# Patient Record
(demographics unavailable — no encounter records)

---

## 2025-03-03 NOTE — REASON FOR VISIT
[Annual Wellness Visit] : an annual wellness visit [FreeTextEntry1] :  passed away  she is grieving. her mom is in a nursing home w dementia

## 2025-03-03 NOTE — COUNSELING
[Encouraged to increase physical activity] : Encouraged to increase physical activity [Fall prevention counseling provided] : Fall prevention counseling provided [Adequate lighting] : Adequate lighting

## 2025-03-03 NOTE — ASSESSMENT
[FreeTextEntry1] : 74 yo wr medicare wellness htn hld  hypothyroidism cont rosuvastin 5 mg qd 90 change to losartan 50 mg qd 90 ( telmisartan not covered) levothyrocine 100 mcg qd 90 gerd  omeprazole 40 mg qd  famotidine 20 mg qd 90  memory loss  trial memantine 10 mg qd 90  titrate to 10 mg bid  as tolerated or to desired effect  consider ssri  need for tdap. Information regarding   tdap    immunization reviewed. All questions answered. Immunization given   .5 cc  intramuscular  l      deltoid. No reaction noted. Advised patients to wash hands to reduce the spread of infection  health maintenance mammogram screening breast cancer utd pap utd bone density screening osteoporosis done colonoscopy due in 2 y   annual eye exam utd

## 2025-03-03 NOTE — HISTORY OF PRESENT ILLNESS
[FreeTextEntry1] : patient presents for CPE.  [de-identified] : 73 F is here for physical examination. my  passed away in Dec. I am very saddened.  my memory is poor My mom just went into a nursing home I have knee pain i got cortisone  and gel shots and it helped. i use a cane at times. I got injections in my back it helped a lot   3/2/24 cpe dr aponte  She is concerned with memory loss at times due to mothers diagnosis of dementia. Denies syncope, headaches, alcohol abuse, chest pain.

## 2025-03-03 NOTE — PHYSICAL EXAM
[No Acute Distress] : no acute distress [Normal Sclera/Conjunctiva] : normal sclera/conjunctiva [Normal Outer Ear/Nose] : the outer ears and nose were normal in appearance [Normal Oropharynx] : the oropharynx was normal [Normal TMs] : both tympanic membranes were normal [No JVD] : no jugular venous distention [No Lymphadenopathy] : no lymphadenopathy [Supple] : supple [Thyroid Normal, No Nodules] : the thyroid was normal and there were no nodules present [No Respiratory Distress] : no respiratory distress  [No Accessory Muscle Use] : no accessory muscle use [Clear to Auscultation] : lungs were clear to auscultation bilaterally [Normal Rate] : normal rate  [Regular Rhythm] : with a regular rhythm [Normal S1, S2] : normal S1 and S2 [No Murmur] : no murmur heard [No Carotid Bruits] : no carotid bruits [Pedal Pulses Present] : the pedal pulses are present [No Extremity Clubbing/Cyanosis] : no extremity clubbing/cyanosis [Soft] : abdomen soft [Non Tender] : non-tender [Non-distended] : non-distended [No HSM] : no HSM [Normal Bowel Sounds] : normal bowel sounds [Normal Posterior Cervical Nodes] : no posterior cervical lymphadenopathy [Normal Anterior Cervical Nodes] : no anterior cervical lymphadenopathy [No Spinal Tenderness] : no spinal tenderness [Grossly Normal Strength/Tone] : grossly normal strength/tone [No Focal Deficits] : no focal deficits [Normal Gait] : normal gait [Normal Affect] : the affect was normal [Alert and Oriented x3] : oriented to person, place, and time [Kyphosis] : no kyphosis

## 2025-03-03 NOTE — HEALTH RISK ASSESSMENT
[Excellent] : ~his/her~  mood as  excellent [No] : In the past 12 months have you used drugs other than those required for medical reasons? No [No falls in past year] : Patient reported no falls in the past year [0] : 2) Feeling down, depressed, or hopeless: Not at all (0) [PHQ-2 Negative - No further assessment needed] : PHQ-2 Negative - No further assessment needed [Other____] : [unfilled] [NO] : No [Patient declined Low Dose CT Scan] : Patient declined Low Dose CT Scan [No Retinopathy] : No retinopathy [Patient reported mammogram was normal] : Patient reported mammogram was normal [Patient reported PAP Smear was normal] : Patient reported PAP Smear was normal [Patient reported colonoscopy was normal] : Patient reported colonoscopy was normal [None] : None [Feels Safe at Home] : Feels safe at home [Fully functional (bathing, dressing, toileting, transferring, walking, feeding)] : Fully functional (bathing, dressing, toileting, transferring, walking, feeding) [Fully functional (using the telephone, shopping, preparing meals, housekeeping, doing laundry, using] : Fully functional and needs no help or supervision to perform IADLs (using the telephone, shopping, preparing meals, housekeeping, doing laundry, using transportation, managing medications and managing finances) [Yes] : Yes [4 or more  times a week (4 pts)] : 4 or more  times a week (4 points) [1 or 2 (0 pts)] : 1 or 2 (0 points) [Never (0 pts)] : Never (0 points) [Little interest or pleasure doing things] : 1) Little interest or pleasure doing things [Feeling down, depressed, or hopeless] : 2) Feeling down, depressed, or hopeless [Former] : Former [5-9] : 5-9 [> 15 Years] : > 15 Years [HIV test declined] : HIV test declined [Hepatitis C test declined] : Hepatitis C test declined [Alone] : lives alone [# of Members in Household ___] :  household currently consist of [unfilled] member(s) [Retired] : retired [College] : College [] :  [# Of Children ___] : has [unfilled] children [Smoke Detector] : smoke detector [Carbon Monoxide Detector] : carbon monoxide detector [Safety elements used in home] : safety elements used in home [Seat Belt] :  uses seat belt [Sunscreen] : uses sunscreen [With Patient/Caregiver] : , with patient/caregiver [Designated Healthcare Proxy] : Designated healthcare proxy [Name: ___] : Health Care Proxy's Name: [unfilled]  [Relationship: ___] : Relationship: [unfilled] [Aggressive treatment] : aggressive treatment [Comfort care only] : comfort care only [FreeTextEntry1] :  [facility] [de-identified] : no  [de-identified] : Derm, GYN [Audit-CScore] : 4 [de-identified] : walk [de-identified] : no [[id number]] : 0 [LowDoseCTScan] : 2025 [EyeExamDate] : 2025 [Change in mental status noted] : No change in mental status noted [Language] : denies difficulty with language [Behavior] : denies difficulty with behavior [Learning/Retaining New Information] : denies difficulty learning/retaining new information [Handling Complex Tasks] : denies difficulty handling complex tasks [Reasoning] : denies difficulty with reasoning [Spatial Ability and Orientation] : denies difficulty with spatial ability and orientation [Sexually Active] : not sexually active [Reports changes in hearing] : Reports no changes in hearing [Reports changes in vision] : Reports no changes in vision [Reports normal functional visual acuity (ie: able to read med bottle)] : Reports poor functional visual acuity.  [Reports changes in dental health] : Reports no changes in dental health [Travel to Developing Areas] : does not  travel to developing areas [TB Exposure] : is not being exposed to tuberculosis [Caregiver Concerns] : does not have caregiver concerns [MammogramDate] : 11/5/2024 [PapSmearDate] : 2022 [BoneDensityDate] : 2022 [BoneDensityComments] : osteopenia  [ColonoscopyDate] : 2022 [AdvancecareDate] : 3/3/25